# Patient Record
Sex: FEMALE | Race: OTHER | HISPANIC OR LATINO | URBAN - METROPOLITAN AREA
[De-identification: names, ages, dates, MRNs, and addresses within clinical notes are randomized per-mention and may not be internally consistent; named-entity substitution may affect disease eponyms.]

---

## 2024-11-12 ENCOUNTER — APPOINTMENT (RX ONLY)
Dept: URBAN - METROPOLITAN AREA CLINIC 319 | Facility: CLINIC | Age: 59
Setting detail: DERMATOLOGY
End: 2024-11-12

## 2024-11-12 DIAGNOSIS — L98.8 OTHER SPECIFIED DISORDERS OF THE SKIN AND SUBCUTANEOUS TISSUE: ICD-10-CM

## 2024-11-12 PROCEDURE — ? DEFER

## 2024-11-12 PROCEDURE — ? FULL BODY SKIN EXAM - DECLINED

## 2024-11-12 PROCEDURE — ? COUNSELING

## 2024-11-12 PROCEDURE — 99202 OFFICE O/P NEW SF 15 MIN: CPT

## 2024-11-12 ASSESSMENT — LOCATION SIMPLE DESCRIPTION DERM: LOCATION SIMPLE: LEFT LIP

## 2024-11-12 ASSESSMENT — LOCATION DETAILED DESCRIPTION DERM: LOCATION DETAILED: LEFT INFERIOR VERMILION LIP

## 2024-11-12 ASSESSMENT — LOCATION ZONE DERM: LOCATION ZONE: LIP

## 2024-11-12 NOTE — PROCEDURE: DEFER
Reason To Defer Override: refer to [facility] for laser therapy
Size Of Lesion In Cm (Optional): 0
Detail Level: Detailed
Introduction Text (Please End With A Colon): The following procedure was deferred:

## 2024-12-17 ENCOUNTER — APPOINTMENT (OUTPATIENT)
Dept: URBAN - METROPOLITAN AREA CLINIC 319 | Facility: CLINIC | Age: 59
Setting detail: DERMATOLOGY
End: 2024-12-17

## 2024-12-17 DIAGNOSIS — L98.8 OTHER SPECIFIED DISORDERS OF THE SKIN AND SUBCUTANEOUS TISSUE: ICD-10-CM

## 2024-12-17 PROCEDURE — ? GENTLEMAX

## 2024-12-17 PROCEDURE — ? FULL BODY SKIN EXAM - DECLINED

## 2024-12-17 ASSESSMENT — LOCATION ZONE DERM: LOCATION ZONE: LIP

## 2024-12-17 ASSESSMENT — LOCATION DETAILED DESCRIPTION DERM: LOCATION DETAILED: LEFT INFERIOR VERMILION LIP

## 2024-12-17 ASSESSMENT — LOCATION SIMPLE DESCRIPTION DERM: LOCATION SIMPLE: LEFT LIP

## 2024-12-17 NOTE — PROCEDURE: GENTLEMAX
Spot Size: 3 mm
Treatment Number: 1
Price (Use Numbers Only, No Special Characters Or $): 100
Pain Scale Out Of 10: 3
Endpoint: Immediate endpoint: perifollicular erythema and edema. Cordran cream and Arnica cream applied. Post care reviewed with patient.
External Cooling Fan Speed: 0
Cooling Dcd Delay: 30
Detail Level: Simple
Indication Override: venous lake
Cooling Dcd Spray: 40
Fluence: 180
Pulse Duration: 10 ms
Post-Care Instructions: I reviewed with the patient in detail post-care instructions. Patient should avoid sun for a minimum of 4 weeks before and after treatment.
Consent: Written consent obtained, risks reviewed including but not limited to crusting, scabbing, blistering, scarring, darker or lighter pigmentary change, paradoxical hair regrowth, incomplete removal of hair and infection.

## 2025-01-21 ENCOUNTER — APPOINTMENT (OUTPATIENT)
Dept: URBAN - METROPOLITAN AREA CLINIC 319 | Facility: CLINIC | Age: 60
Setting detail: DERMATOLOGY
End: 2025-01-21

## 2025-01-21 DIAGNOSIS — L98.8 OTHER SPECIFIED DISORDERS OF THE SKIN AND SUBCUTANEOUS TISSUE: ICD-10-CM | Status: RESOLVED

## 2025-01-21 PROCEDURE — 99212 OFFICE O/P EST SF 10 MIN: CPT

## 2025-01-21 PROCEDURE — ? COUNSELING

## 2025-01-21 PROCEDURE — ? FULL BODY SKIN EXAM - DECLINED

## 2025-01-21 PROCEDURE — ? ADDITIONAL NOTES
